# Patient Record
Sex: MALE | Race: WHITE | NOT HISPANIC OR LATINO | Employment: FULL TIME | URBAN - NONMETROPOLITAN AREA
[De-identification: names, ages, dates, MRNs, and addresses within clinical notes are randomized per-mention and may not be internally consistent; named-entity substitution may affect disease eponyms.]

---

## 2023-05-12 ENCOUNTER — HOSPITAL ENCOUNTER (EMERGENCY)
Facility: HOSPITAL | Age: 33
Discharge: HOME | End: 2023-05-12
Attending: EMERGENCY MEDICINE | Admitting: EMERGENCY MEDICINE

## 2023-05-12 VITALS
DIASTOLIC BLOOD PRESSURE: 79 MMHG | OXYGEN SATURATION: 98 % | RESPIRATION RATE: 16 BRPM | TEMPERATURE: 97.9 F | SYSTOLIC BLOOD PRESSURE: 130 MMHG | HEART RATE: 65 BPM

## 2023-05-12 DIAGNOSIS — R04.0 LEFT-SIDED EPISTAXIS: Primary | ICD-10-CM

## 2023-05-12 LAB
ALBUMIN SERPL BCP-MCNC: 4.1 G/DL (ref 3.5–5)
ALP SERPL-CCNC: 52 U/L (ref 34–104)
ALT SERPL W P-5'-P-CCNC: 14 U/L (ref 7–52)
ANION GAP SERPL CALCULATED.3IONS-SCNC: 6 MMOL/L (ref 4–13)
APTT PPP: 27 SECONDS (ref 23–37)
AST SERPL W P-5'-P-CCNC: 14 U/L (ref 13–39)
BASOPHILS # BLD AUTO: 0.06 THOUSANDS/ÂΜL (ref 0–0.1)
BASOPHILS NFR BLD AUTO: 1 % (ref 0–1)
BILIRUB SERPL-MCNC: 0.75 MG/DL (ref 0.2–1)
BUN SERPL-MCNC: 5 MG/DL (ref 5–25)
CALCIUM SERPL-MCNC: 9 MG/DL (ref 8.4–10.2)
CHLORIDE SERPL-SCNC: 106 MMOL/L (ref 96–108)
CO2 SERPL-SCNC: 28 MMOL/L (ref 21–32)
CREAT SERPL-MCNC: 0.76 MG/DL (ref 0.6–1.3)
EOSINOPHIL # BLD AUTO: 0.1 THOUSAND/ÂΜL (ref 0–0.61)
EOSINOPHIL NFR BLD AUTO: 2 % (ref 0–6)
ERYTHROCYTE [DISTWIDTH] IN BLOOD BY AUTOMATED COUNT: 13.6 % (ref 11.6–15.1)
GFR SERPL CREATININE-BSD FRML MDRD: 120 ML/MIN/1.73SQ M
GLUCOSE SERPL-MCNC: 104 MG/DL (ref 65–140)
HCT VFR BLD AUTO: 48.3 % (ref 36.5–49.3)
HGB BLD-MCNC: 15.9 G/DL (ref 12–17)
IMM GRANULOCYTES # BLD AUTO: 0.01 THOUSAND/UL (ref 0–0.2)
IMM GRANULOCYTES NFR BLD AUTO: 0 % (ref 0–2)
INR PPP: 1.17 (ref 0.84–1.19)
LYMPHOCYTES # BLD AUTO: 1.54 THOUSANDS/ÂΜL (ref 0.6–4.47)
LYMPHOCYTES NFR BLD AUTO: 34 % (ref 14–44)
MCH RBC QN AUTO: 29.7 PG (ref 26.8–34.3)
MCHC RBC AUTO-ENTMCNC: 32.9 G/DL (ref 31.4–37.4)
MCV RBC AUTO: 90 FL (ref 82–98)
MONOCYTES # BLD AUTO: 0.59 THOUSAND/ÂΜL (ref 0.17–1.22)
MONOCYTES NFR BLD AUTO: 13 % (ref 4–12)
NEUTROPHILS # BLD AUTO: 2.27 THOUSANDS/ÂΜL (ref 1.85–7.62)
NEUTS SEG NFR BLD AUTO: 50 % (ref 43–75)
NRBC BLD AUTO-RTO: 0 /100 WBCS
PLATELET # BLD AUTO: 357 THOUSANDS/UL (ref 149–390)
PMV BLD AUTO: 8.5 FL (ref 8.9–12.7)
POTASSIUM SERPL-SCNC: 4.1 MMOL/L (ref 3.5–5.3)
PROT SERPL-MCNC: 6.9 G/DL (ref 6.4–8.4)
PROTHROMBIN TIME: 15 SECONDS (ref 11.6–14.5)
RBC # BLD AUTO: 5.36 MILLION/UL (ref 3.88–5.62)
SODIUM SERPL-SCNC: 140 MMOL/L (ref 135–147)
WBC # BLD AUTO: 4.57 THOUSAND/UL (ref 4.31–10.16)

## 2023-05-12 NOTE — Clinical Note
Aubrey Woody was seen and treated in our emergency department on 5/12/2023  Diagnosis:     Neto Farias  may return to work on return date  He may return on this date: 05/13/2023         If you have any questions or concerns, please don't hesitate to call        Marcelo Cantrell MD    ______________________________           _______________          _______________  Hospital Representative                              Date                                Time

## 2023-05-12 NOTE — ED PROVIDER NOTES
History  Chief Complaint   Patient presents with   • Nose Bleed     Patient reports hx nose bleeds his entire life  Nose bleeds have been recurring more frequently since Monday  Patient's nose not bleeding on arrival, but last nose bleed was this AM and lasted 3-4 minutes  Patient reports nose bleed on Monday last 6-7 minutes  Patient not on thinners  Had nose cauterized as a child, but not recently  Has not seen ENT  Patient's been having intermittent nosebleeds for the past 4 days  Does have a past history of nosebleeds has never seen ENT  No aspirin  No blood thinners  No trauma  No recent cough or cold symptoms  Patient states he felt weak and tired  Felt like he was in a pass out yesterday  Today he another episode of a nosebleed from the right nares that lasted approximately 3 to 4 minutes  Resolved on its own  Feels weak and tired  No energy  History provided by:  Patient   used: No    Nose Bleed  Location:  R nare  Severity:  Mild  Duration:  4 days  Timing:  Intermittent  Progression:  Resolved  Chronicity:  New  Context: not anticoagulants, not aspirin use, not bleeding disorder, not elevation change, not recent infection and not thrombocytopenia    Relieved by:  Applying pressure  Worsened by:  Nothing  Ineffective treatments:  None tried  Associated symptoms: no blood in oropharynx, no congestion, no cough, no dizziness, no fever, no headaches, no sneezing, no sore throat and no syncope        None       History reviewed  No pertinent past medical history  Past Surgical History:   Procedure Laterality Date   • APPENDECTOMY     • WISDOM TOOTH EXTRACTION         History reviewed  No pertinent family history  I have reviewed and agree with the history as documented      E-Cigarette/Vaping   • E-Cigarette Use Former User      E-Cigarette/Vaping Substances     Social History     Tobacco Use   • Smoking status: Never   • Smokeless tobacco: Never   Vaping Use   • Vaping Use: Former   Substance Use Topics   • Alcohol use: Yes     Comment: Social   • Drug use: Never       Review of Systems   Constitutional: Positive for fatigue  Negative for chills and fever  HENT: Positive for nosebleeds  Negative for congestion, ear pain, hearing loss, sneezing, sore throat, trouble swallowing and voice change  Eyes: Negative for pain and discharge  Respiratory: Negative for cough, shortness of breath and wheezing  Cardiovascular: Negative for chest pain, palpitations and syncope  Gastrointestinal: Negative for abdominal pain, blood in stool, constipation, diarrhea, nausea and vomiting  Genitourinary: Negative for dysuria, flank pain, frequency and hematuria  Musculoskeletal: Negative for joint swelling, neck pain and neck stiffness  Skin: Negative for rash and wound  Neurological: Positive for weakness and light-headedness  Negative for dizziness, seizures, syncope, facial asymmetry and headaches  Psychiatric/Behavioral: Negative for hallucinations, self-injury and suicidal ideas  All other systems reviewed and are negative  Physical Exam  Physical Exam  Vitals and nursing note reviewed  Constitutional:       General: He is not in acute distress  Appearance: He is well-developed  HENT:      Head: Normocephalic and atraumatic  Right Ear: External ear normal       Left Ear: External ear normal       Nose:      Comments: No bleeding and no blood noted in either nares  Mouth/Throat:      Comments: Oropharynx without any blood  Eyes:      General: No scleral icterus  Right eye: No discharge  Left eye: No discharge  Extraocular Movements: Extraocular movements intact  Conjunctiva/sclera: Conjunctivae normal    Cardiovascular:      Rate and Rhythm: Normal rate and regular rhythm  Heart sounds: Normal heart sounds  No murmur heard    Pulmonary:      Effort: Pulmonary effort is normal       Breath sounds: Normal breath sounds  No wheezing or rales  Abdominal:      General: Bowel sounds are normal  There is no distension  Palpations: Abdomen is soft  Tenderness: There is no abdominal tenderness  There is no guarding or rebound  Musculoskeletal:         General: No deformity  Normal range of motion  Cervical back: Normal range of motion and neck supple  Skin:     General: Skin is warm and dry  Findings: No rash  Neurological:      General: No focal deficit present  Mental Status: He is alert and oriented to person, place, and time  Cranial Nerves: No cranial nerve deficit  Psychiatric:         Mood and Affect: Mood normal          Behavior: Behavior normal          Thought Content:  Thought content normal          Judgment: Judgment normal          Vital Signs  ED Triage Vitals [05/12/23 0822]   Temperature Pulse Respirations Blood Pressure SpO2   97 9 °F (36 6 °C) 65 16 130/79 98 %      Temp Source Heart Rate Source Patient Position - Orthostatic VS BP Location FiO2 (%)   Oral Monitor Lying Left arm --      Pain Score       4           Vitals:    05/12/23 0822   BP: 130/79   Pulse: 65   Patient Position - Orthostatic VS: Lying         Visual Acuity      ED Medications  Medications - No data to display    Diagnostic Studies  Results Reviewed     Procedure Component Value Units Date/Time    Protime-INR [[id number]]  (Abnormal) Collected: 05/12/23 0830    Lab Status: Final result Specimen: Blood from Arm, Right Updated: 05/12/23 0859     Protime 15 0 seconds      INR 1 17    APTT [[id number]]  (Normal) Collected: 05/12/23 0830    Lab Status: Final result Specimen: Blood from Arm, Right Updated: 05/12/23 0859     PTT 27 seconds     Comprehensive metabolic panel [[id number]] Collected: 05/12/23 0830    Lab Status: Final result Specimen: Blood from Arm, Right Updated: 05/12/23 0855     Sodium 140 mmol/L      Potassium 4 1 mmol/L      Chloride 106 mmol/L      CO2 28 mmol/L      ANION GAP 6 mmol/L BUN 5 mg/dL      Creatinine 0 76 mg/dL      Glucose 104 mg/dL      Calcium 9 0 mg/dL      AST 14 U/L      ALT 14 U/L      Alkaline Phosphatase 52 U/L      Total Protein 6 9 g/dL      Albumin 4 1 g/dL      Total Bilirubin 0 75 mg/dL      eGFR 120 ml/min/1 73sq m     Narrative:      [employer] guidelines for Chronic Kidney Disease (CKD):   •  Stage 1 with normal or high GFR (GFR > 90 mL/min/1 73 square meters)  •  Stage 2 Mild CKD (GFR = 60-89 mL/min/1 73 square meters)  •  Stage 3A Moderate CKD (GFR = 45-59 mL/min/1 73 square meters)  •  Stage 3B Moderate CKD (GFR = 30-44 mL/min/1 73 square meters)  •  Stage 4 Severe CKD (GFR = 15-29 mL/min/1 73 square meters)  •  Stage 5 End Stage CKD (GFR <15 mL/min/1 73 square meters)  Note: GFR calculation is accurate only with a steady state creatinine    CBC and differential [[id number]]  (Abnormal) Collected: 05/12/23 0830    Lab Status: Final result Specimen: Blood from Arm, Right Updated: 05/12/23 0836     WBC 4 57 Thousand/uL      RBC 5 36 Million/uL      Hemoglobin 15 9 g/dL      Hematocrit 48 3 %      MCV 90 fL      MCH 29 7 pg      MCHC 32 9 g/dL      RDW 13 6 %      MPV 8 5 fL      Platelets 541 Thousands/uL      nRBC 0 /100 WBCs      Neutrophils Relative 50 %      Immat GRANS % 0 %      Lymphocytes Relative 34 %      Monocytes Relative 13 %      Eosinophils Relative 2 %      Basophils Relative 1 %      Neutrophils Absolute 2 27 Thousands/µL      Immature Grans Absolute 0 01 Thousand/uL      Lymphocytes Absolute 1 54 Thousands/µL      Monocytes Absolute 0 59 Thousand/µL      Eosinophils Absolute 0 10 Thousand/µL      Basophils Absolute 0 06 Thousands/µL                  No orders to display              Procedures  ECG 12 Lead Documentation Only    Date/Time: 5/12/2023 8:20 AM  Performed by: Daniel Saxena MD  Authorized by: Daniel Saxena MD     ECG reviewed by me, the ED Provider: yes    Patient location:  ED  Rate:     ECG rate: 70  Rhythm:     Rhythm: sinus rhythm    Ectopy:     Ectopy: none    QRS:     QRS axis:  Normal             ED Course  ED Course as of 05/12/23 0901   Fri May 12, 2023   0820 Patient was only having nosebleeds out of the left nares  This makes a posterior nosebleed less likely  Patient with no obvious source of bleeding on exam   No active bleeding at this time  We will check labs to make sure the patient is not anemic  We will check chemistries to make sure the patient is not diabetic because of the recent history of weakness and fatigue  We will get an EKG because of the dizziness to make sure the patient does not have an arrhythmia    7833 Patient is hemodynamically stable  Not tachycardic nor tachypneic  Hemoglobin is 15 9  Glucose is normal   INR is normal                                SBIRT 22yo+    Flowsheet Row Most Recent Value   Initial Alcohol Screen: US AUDIT-C     1  How often do you have a drink containing alcohol? 0 Filed at: 05/12/2023 0822   2  How many drinks containing alcohol do you have on a typical day you are drinking? 0 Filed at: 05/12/2023 0822   3a  Male UNDER 65: How often do you have five or more drinks on one occasion? 0 Filed at: 05/12/2023 0822   3b  FEMALE Any Age, or MALE 65+: How often do you have 4 or more drinks on one occassion? 0 Filed at: 05/12/2023 7893   Audit-C Score 0 Filed at: 05/12/2023 4611   MILI: How many times in the past year have you    Used an illegal drug or used a prescription medication for non-medical reasons? Never Filed at: 05/12/2023 5636                    Medical Decision Making  Amount and/or Complexity of Data Reviewed  Labs: ordered  Decision-making details documented in ED Course  ECG/medicine tests: ordered and independent interpretation performed  Decision-making details documented in ED Course            Disposition  Final diagnoses:   Left-sided epistaxis     Time reflects when diagnosis was documented in both MDM as applicable and the Disposition within this note     Time User Action Codes Description Comment    5/12/2023  8:34 AM Stephanie Butt Add [R04 0] Left-sided epistaxis       ED Disposition     ED Disposition   Discharge    Condition   Stable    Date/Time   Fri May 12, 2023  8:34 AM    Comment   Ailyn Valerio discharge to home/self care  Follow-up Information     Follow up With Specialties Details Why Felicia Kelley MD Otolaryngology Call today  [address] [place] [address]  [phone]            Patient's Medications    No medications on file       No discharge procedures on file      PDMP Review     None          ED Provider  Electronically Signed by           Diamond Blanton MD  05/12/23 5743

## 2023-05-25 LAB
ATRIAL RATE: 68 BPM
P AXIS: 15 DEGREES
PR INTERVAL: 118 MS
QRS AXIS: 82 DEGREES
QRSD INTERVAL: 96 MS
QT INTERVAL: 398 MS
QTC INTERVAL: 423 MS
T WAVE AXIS: 48 DEGREES
VENTRICULAR RATE: 68 BPM